# Patient Record
Sex: OTHER/UNKNOWN | URBAN - METROPOLITAN AREA
[De-identification: names, ages, dates, MRNs, and addresses within clinical notes are randomized per-mention and may not be internally consistent; named-entity substitution may affect disease eponyms.]

---

## 2020-10-25 ENCOUNTER — HOSPITAL ENCOUNTER (EMERGENCY)
Age: 45
Discharge: LEFT BEFORE DISCHARGE | End: 2020-10-26
Attending: EMERGENCY MEDICINE

## 2020-10-25 VITALS
OXYGEN SATURATION: 98 % | DIASTOLIC BLOOD PRESSURE: 78 MMHG | SYSTOLIC BLOOD PRESSURE: 154 MMHG | TEMPERATURE: 97.2 F | RESPIRATION RATE: 18 BRPM | HEART RATE: 90 BPM

## 2020-10-25 LAB
CHP ED QC CHECK: YES
GLUCOSE BLD-MCNC: 82 MG/DL
METER GLUCOSE: 82 MG/DL (ref 74–99)

## 2020-10-25 PROCEDURE — 99282 EMERGENCY DEPT VISIT SF MDM: CPT

## 2020-10-25 PROCEDURE — 82962 GLUCOSE BLOOD TEST: CPT

## 2020-10-26 NOTE — ED NOTES
Bed: 11  Expected date:   Expected time:   Means of arrival:   Comments:  edgardo Causey RN  10/25/20 [id number]

## 2020-10-26 NOTE — ED NOTES
Patient has eloped. Dr. Mitali Camargo and charge nurse aware.      Ramila Chandler RN  10/25/20 0947

## 2020-10-26 NOTE — ED PROVIDER NOTES
Department of Emergency Medicine   ED  Provider Note  Admit Date/RoomTime: 10/25/2020 10:49 PM  ED Room: 11/11          History of Present Illness:  10/25/20, Time: 11:46 PM EDT  No chief complaint on file. Rodger Cramer is a 39 y.o. adult presenting to the ED for drug overdose. Patient was pulled out of the car in the parking lot. Friend states that the patient may have overdosed on heroin. She does have a history of heroin use. They said she was fine 15 minutes ago, they returned and found her unresponsive. They drove her straight to the ER. Currently, patient is unresponsive and unable to provide any other history. Review of Systems:   Unable to obtain due to the patient's current mental status        --------------------------------------------- PAST HISTORY ---------------------------------------------  Past Medical History:  has no past medical history on file. Past Surgical History:  has no past surgical history on file. Social History:      Family History: family history is not on file. . Unless otherwise noted, family history is non contributory    The patients home medications have been reviewed. Allergies: Patient has no allergy information on record.        ---------------------------------------------------PHYSICAL EXAM--------------------------------------    Constitutional/General: Unresponsive  Head: Normocephalic and atraumatic  Eyes:  Pinpoint pupils  Mouth: Oropharynx clear, handling secretions, no trismus, no asymmetry of the posterior oropharynx or uvular edema  Neck: Supple, full ROM, no stridor, no meningeal signs  Respiratory: Lungs clear to auscultation bilaterally, no wheezes, rales, or rhonchi. Not in respiratory distress  Cardiovascular:  Regular tachycardia. Regular rhythm. 2+ distal pulses. Equal extremity pulses. Chest: No chest wall tenderness  GI:  Abdomen Soft, Non tender, Non distended. No rebound, guarding, or rigidity.  No pulsatile nursing staff the patient did elope. Critical care time: 33 minutes      Counseling: The emergency provider has spoken with the patient and discussed todays results, in addition to providing specific details for the plan of care and counseling regarding the diagnosis and prognosis. Questions are answered at this time and they are agreeable with the plan.       --------------------------------- IMPRESSION AND DISPOSITION ---------------------------------    IMPRESSION  1. Accidental overdose of heroin, [facility]        DISPOSITION  Disposition: Other Disposition: Eloped  Patient condition is stable        NOTE: This report was transcribed using voice recognition software.  Every effort was made to ensure accuracy; however, inadvertent computerized transcription errors may be present        Anne Fiore MD  10/25/20 2752